# Patient Record
Sex: FEMALE | Race: WHITE | NOT HISPANIC OR LATINO | Employment: FULL TIME | URBAN - METROPOLITAN AREA
[De-identification: names, ages, dates, MRNs, and addresses within clinical notes are randomized per-mention and may not be internally consistent; named-entity substitution may affect disease eponyms.]

---

## 2022-08-06 ENCOUNTER — HOSPITAL ENCOUNTER (EMERGENCY)
Facility: MEDICAL CENTER | Age: 37
End: 2022-08-06
Attending: EMERGENCY MEDICINE
Payer: OTHER MISCELLANEOUS

## 2022-08-06 VITALS
HEART RATE: 72 BPM | OXYGEN SATURATION: 94 % | RESPIRATION RATE: 16 BRPM | BODY MASS INDEX: 41.59 KG/M2 | DIASTOLIC BLOOD PRESSURE: 83 MMHG | HEIGHT: 67 IN | WEIGHT: 265 LBS | TEMPERATURE: 98 F | SYSTOLIC BLOOD PRESSURE: 120 MMHG

## 2022-08-06 DIAGNOSIS — T67.5XXA HEAT EXHAUSTION, INITIAL ENCOUNTER: ICD-10-CM

## 2022-08-06 DIAGNOSIS — N30.00 ACUTE CYSTITIS WITHOUT HEMATURIA: ICD-10-CM

## 2022-08-06 LAB
ANION GAP SERPL CALC-SCNC: 14 MMOL/L (ref 7–16)
APPEARANCE UR: CLEAR
BACTERIA #/AREA URNS HPF: ABNORMAL /HPF
BILIRUB UR QL STRIP.AUTO: NEGATIVE
BUN SERPL-MCNC: 9 MG/DL (ref 8–22)
CALCIUM SERPL-MCNC: 8.9 MG/DL (ref 8.5–10.5)
CHLORIDE SERPL-SCNC: 106 MMOL/L (ref 96–112)
CO2 SERPL-SCNC: 19 MMOL/L (ref 20–33)
COLOR UR: YELLOW
CREAT SERPL-MCNC: 0.99 MG/DL (ref 0.5–1.4)
EKG IMPRESSION: NORMAL
EPI CELLS #/AREA URNS HPF: ABNORMAL /HPF
GFR SERPLBLD CREATININE-BSD FMLA CKD-EPI: 76 ML/MIN/1.73 M 2
GLUCOSE SERPL-MCNC: 103 MG/DL (ref 65–99)
GLUCOSE UR STRIP.AUTO-MCNC: NEGATIVE MG/DL
HCG SERPL QL: NEGATIVE
HYALINE CASTS #/AREA URNS LPF: ABNORMAL /LPF
KETONES UR STRIP.AUTO-MCNC: NEGATIVE MG/DL
LEUKOCYTE ESTERASE UR QL STRIP.AUTO: ABNORMAL
MICRO URNS: ABNORMAL
NITRITE UR QL STRIP.AUTO: NEGATIVE
PH UR STRIP.AUTO: 6 [PH] (ref 5–8)
POTASSIUM SERPL-SCNC: 3.5 MMOL/L (ref 3.6–5.5)
PROT UR QL STRIP: NEGATIVE MG/DL
RBC # URNS HPF: ABNORMAL /HPF
RBC UR QL AUTO: NEGATIVE
SODIUM SERPL-SCNC: 139 MMOL/L (ref 135–145)
SP GR UR STRIP.AUTO: 1.01
UROBILINOGEN UR STRIP.AUTO-MCNC: 0.2 MG/DL
WBC #/AREA URNS HPF: ABNORMAL /HPF

## 2022-08-06 PROCEDURE — 700111 HCHG RX REV CODE 636 W/ 250 OVERRIDE (IP): Performed by: EMERGENCY MEDICINE

## 2022-08-06 PROCEDURE — 93005 ELECTROCARDIOGRAM TRACING: CPT

## 2022-08-06 PROCEDURE — 93005 ELECTROCARDIOGRAM TRACING: CPT | Performed by: EMERGENCY MEDICINE

## 2022-08-06 PROCEDURE — 87077 CULTURE AEROBIC IDENTIFY: CPT

## 2022-08-06 PROCEDURE — 81001 URINALYSIS AUTO W/SCOPE: CPT

## 2022-08-06 PROCEDURE — 96375 TX/PRO/DX INJ NEW DRUG ADDON: CPT

## 2022-08-06 PROCEDURE — 700105 HCHG RX REV CODE 258: Performed by: EMERGENCY MEDICINE

## 2022-08-06 PROCEDURE — 36415 COLL VENOUS BLD VENIPUNCTURE: CPT

## 2022-08-06 PROCEDURE — 87086 URINE CULTURE/COLONY COUNT: CPT

## 2022-08-06 PROCEDURE — 99284 EMERGENCY DEPT VISIT MOD MDM: CPT

## 2022-08-06 PROCEDURE — 84703 CHORIONIC GONADOTROPIN ASSAY: CPT

## 2022-08-06 PROCEDURE — 700102 HCHG RX REV CODE 250 W/ 637 OVERRIDE(OP): Performed by: EMERGENCY MEDICINE

## 2022-08-06 PROCEDURE — A9270 NON-COVERED ITEM OR SERVICE: HCPCS | Performed by: EMERGENCY MEDICINE

## 2022-08-06 PROCEDURE — 80048 BASIC METABOLIC PNL TOTAL CA: CPT

## 2022-08-06 PROCEDURE — 96374 THER/PROPH/DIAG INJ IV PUSH: CPT

## 2022-08-06 RX ORDER — CEFTRIAXONE 2 G/1
2 INJECTION, POWDER, FOR SOLUTION INTRAMUSCULAR; INTRAVENOUS ONCE
Status: COMPLETED | OUTPATIENT
Start: 2022-08-06 | End: 2022-08-06

## 2022-08-06 RX ORDER — SODIUM CHLORIDE, SODIUM LACTATE, POTASSIUM CHLORIDE, CALCIUM CHLORIDE 600; 310; 30; 20 MG/100ML; MG/100ML; MG/100ML; MG/100ML
1000 INJECTION, SOLUTION INTRAVENOUS ONCE
Status: COMPLETED | OUTPATIENT
Start: 2022-08-06 | End: 2022-08-06

## 2022-08-06 RX ORDER — ACETAMINOPHEN 325 MG/1
650 TABLET ORAL ONCE
Status: COMPLETED | OUTPATIENT
Start: 2022-08-06 | End: 2022-08-06

## 2022-08-06 RX ORDER — SULFAMETHOXAZOLE AND TRIMETHOPRIM 800; 160 MG/1; MG/1
1 TABLET ORAL 2 TIMES DAILY
Qty: 14 TABLET | Refills: 0 | Status: SHIPPED | OUTPATIENT
Start: 2022-08-06 | End: 2022-08-10

## 2022-08-06 RX ORDER — KETOROLAC TROMETHAMINE 30 MG/ML
15 INJECTION, SOLUTION INTRAMUSCULAR; INTRAVENOUS ONCE
Status: COMPLETED | OUTPATIENT
Start: 2022-08-06 | End: 2022-08-06

## 2022-08-06 RX ORDER — PROCHLORPERAZINE EDISYLATE 5 MG/ML
10 INJECTION INTRAMUSCULAR; INTRAVENOUS ONCE
Status: COMPLETED | OUTPATIENT
Start: 2022-08-06 | End: 2022-08-06

## 2022-08-06 RX ADMIN — KETOROLAC TROMETHAMINE 15 MG: 30 INJECTION, SOLUTION INTRAMUSCULAR at 18:19

## 2022-08-06 RX ADMIN — CEFTRIAXONE SODIUM 2 G: 2 INJECTION, POWDER, FOR SOLUTION INTRAMUSCULAR; INTRAVENOUS at 19:22

## 2022-08-06 RX ADMIN — PROCHLORPERAZINE EDISYLATE 10 MG: 5 INJECTION INTRAMUSCULAR; INTRAVENOUS at 18:20

## 2022-08-06 RX ADMIN — FENTANYL CITRATE 50 MCG: 50 INJECTION, SOLUTION INTRAMUSCULAR; INTRAVENOUS at 18:20

## 2022-08-06 RX ADMIN — ACETAMINOPHEN 650 MG: 325 TABLET, FILM COATED ORAL at 17:42

## 2022-08-06 RX ADMIN — SODIUM CHLORIDE, POTASSIUM CHLORIDE, SODIUM LACTATE AND CALCIUM CHLORIDE 1000 ML: 600; 310; 30; 20 INJECTION, SOLUTION INTRAVENOUS at 17:42

## 2022-08-06 ASSESSMENT — PAIN DESCRIPTION - PAIN TYPE: TYPE: ACUTE PAIN

## 2022-08-06 ASSESSMENT — LIFESTYLE VARIABLES: DO YOU DRINK ALCOHOL: NO

## 2022-08-06 NOTE — ED TRIAGE NOTES
Patient to ED with complaints of possible heat stroke. She works for the [employer] and was outside in the heat for approximately 2 hrs and developed dizziness and HA. She had a near syncope. EMS reports skin was hot anddiaphoretic on their arrival estimated temp of 102. No s/s of infection. They started cooling efforts. She is now 99.6 oral temp. She is awake and alert. Received 300 ml NS PTA.

## 2022-08-07 NOTE — ED NOTES
Assist: Urine sent to lab. Patient resting on bed talking on phone with side rails up and call light in hand.   Patient on cardiac monitor.     Fluid bolus started.

## 2022-08-07 NOTE — DISCHARGE INSTRUCTIONS
Rest at home and drink lots of fluids and maintain hydration.  You may use Tylenol and Motrin if needed for discomfort.  Return here at once if you feel you are developing new or worsening symptoms.  A work injury form has been filled out for you for the heat exhaustion portion of this visit and you should be okay to return to work on Wednesday, August 10 and this is on the form.

## 2022-08-07 NOTE — ED PROVIDER NOTES
ED Provider Note    CHIEF COMPLAINT  Chief Complaint   Patient presents with   • Heat Exposure   • Near Syncopal       HPI  Steph Roland is a 37 y.o. female who presents to the emergency department after feeling extremely hot and dehydrated and fainted at work.  The patient states that she works as a conductor for [employer] and was working in the yard today she wears a heavy uniform and she was in the sun and heat on a very hot day for about 2 hours and she feels like it was just too much she started to feel dehydrated and tried to drink Gatorade but despite that was extremely sweaty she became very tired she felt faint but did not pass out she developed a headache.  Coworkers noticed that she did not seem to be doing very well and EMS was called.  Apparently prior to arrival the patient had a temperature of 102 but with cooling measures and intravenous fluids her temperature came down to 99.6.  The patient says she is feeling a little bit better now.    REVIEW OF SYSTEMS no chest pain cough or difficulty breathing no hemoptysis no abdominal or back pain.  No pain or discomfort with urination.  All other systems negative    PAST MEDICAL HISTORY  History reviewed. No pertinent past medical history.    FAMILY HISTORY  History reviewed. No pertinent family history.    SOCIAL HISTORY  Social History     Socioeconomic History   • Marital status: Single   Tobacco Use   • Smokeless tobacco: Never Used   Vaping Use   • Vaping Use: Some days   • Substances: Nicotine   Substance and Sexual Activity   • Alcohol use: Yes     Comment: drinks 1-2 times per week   • Drug use: Never       SURGICAL HISTORY  History reviewed. No pertinent surgical history.    CURRENT MEDICATIONS  Home Medications     Reviewed by Elvira Diaz R.N. (Registered Nurse) on 08/06/22 at 1631  Med List Status: Complete   Medication Last Dose Status        Patient Saman Taking any Medications                       ALLERGIES  No Known  "Allergies    PHYSICAL EXAM  VITAL SIGNS: /83   Pulse 72   Temp 36.7 °C (98 °F) (Temporal)   Resp 16   Ht 1.702 m (5' 7\")   Wt 120 kg (265 lb)   LMP 07/23/2022   SpO2 94%   BMI 41.50 kg/m²    Oxygen saturation is interpreted as adequate  Constitutional: Awake and verbal she does not appear toxic or distressed  HENT: No sign of trauma to the head  Eyes: No erythema discharge or jaundice  Neck: Trachea midline no JVD  Cardiovascular: Regular rate and rhythm  Lungs: Clear and equal bilaterally with no apparent difficulty breathing  Abdomen/Back: Obese soft nontender no rebound guarding or peritoneal findings  Skin: Warm and dry  Musculoskeletal: No acute bony deformity  Neurologic: Awake verbal and moving all extremities    Laboratory  Basic metabolic panel shows a slightly low bicarb of 19 urinalysis shows large leukocyte Estrace with  white blood cells and moderate bacteria in the microscopic exam and a pregnancy test is negative.  I have ordered and add on urine culture      MEDICAL DECISION MAKING and DISPOSITION  In the emergency department an IV is established the patient was given intravenous fluids and due to the finding of abnormal urinalysis I ordered and add on culture and give the patient  intravenous ceftriaxone.  The patient is now starting to feel much much better.  I have reviewed all the findings with her.  I think that she suffered from heat exhaustion and that has now improved.  The urinalysis is clearly abnormal suggesting urinary tract infection and I am going to write her a prescription for a 7-day course of Bactrim.  I feel that the heat exhaustion portion of this visit is due to working outside on an extremely hot day and this is a work-related injury and I have filled out the federal work injury report form and advised the patient she will need to follow-up with her employers occupational health clinic during the week for recheck.  I do not think that the urinary tract " infection is work-related but it will require treatment and I have written a prescription for Bactrim as noted above.  The patient is to rest and stay out of the heat and stay well-hydrated she can return to work on Wednesday of next week and she is to follow-up with occupational health as noted.  If it anytime she is developing new or worsening or recurrent symptoms she is to return here for recheck    IMPRESSION  1.  Heat exhaustion, work-related injury  2.  Urinary tract infection      Electronically signed by: Rishi Hammond M.D., 8/6/2022 9:16 PM

## 2022-08-07 NOTE — ED NOTES
Pt given d/c instructions, f/u info and RX x 1 with verbal understanding.  VSS at discharge.  PIV d/c'd with tip intact.  Pt ambulatory from the ED w/ steady gait.  All belongings in possession on discharge.

## 2022-08-08 LAB
BACTERIA UR CULT: ABNORMAL
BACTERIA UR CULT: ABNORMAL
SIGNIFICANT IND 70042: ABNORMAL
SITE SITE: ABNORMAL
SOURCE SOURCE: ABNORMAL

## 2022-08-10 ENCOUNTER — TELEPHONE (OUTPATIENT)
Dept: SCHEDULING | Facility: IMAGING CENTER | Age: 37
End: 2022-08-10

## 2022-08-10 ENCOUNTER — OFFICE VISIT (OUTPATIENT)
Dept: MEDICAL GROUP | Facility: IMAGING CENTER | Age: 37
End: 2022-08-10
Payer: COMMERCIAL

## 2022-08-10 VITALS
DIASTOLIC BLOOD PRESSURE: 80 MMHG | OXYGEN SATURATION: 98 % | TEMPERATURE: 98.4 F | SYSTOLIC BLOOD PRESSURE: 124 MMHG | BODY MASS INDEX: 42.22 KG/M2 | HEIGHT: 67 IN | WEIGHT: 269 LBS | HEART RATE: 70 BPM

## 2022-08-10 DIAGNOSIS — R19.7 DIARRHEA, UNSPECIFIED TYPE: ICD-10-CM

## 2022-08-10 DIAGNOSIS — F33.2 SEVERE EPISODE OF RECURRENT MAJOR DEPRESSIVE DISORDER, WITHOUT PSYCHOTIC FEATURES (HCC): ICD-10-CM

## 2022-08-10 DIAGNOSIS — F41.9 ANXIETY: ICD-10-CM

## 2022-08-10 DIAGNOSIS — Z13.31 DEPRESSION SCREENING: ICD-10-CM

## 2022-08-10 DIAGNOSIS — G43.719 INTRACTABLE CHRONIC MIGRAINE WITHOUT AURA AND WITHOUT STATUS MIGRAINOSUS: ICD-10-CM

## 2022-08-10 DIAGNOSIS — F41.1 GENERALIZED ANXIETY DISORDER: ICD-10-CM

## 2022-08-10 DIAGNOSIS — E66.01 CLASS 3 SEVERE OBESITY DUE TO EXCESS CALORIES WITH SERIOUS COMORBIDITY AND BODY MASS INDEX (BMI) OF 40.0 TO 44.9 IN ADULT (HCC): ICD-10-CM

## 2022-08-10 DIAGNOSIS — Z76.89 ENCOUNTER TO ESTABLISH CARE WITH NEW DOCTOR: ICD-10-CM

## 2022-08-10 DIAGNOSIS — E04.9 THYROID GOITER: ICD-10-CM

## 2022-08-10 PROBLEM — G89.4 CHRONIC PAIN SYNDROME: Status: ACTIVE | Noted: 2021-01-27

## 2022-08-10 PROBLEM — F43.12 CHRONIC POST-TRAUMATIC STRESS DISORDER (PTSD): Status: ACTIVE | Noted: 2021-01-27

## 2022-08-10 PROBLEM — F50.81 BINGE EATING DISORDER: Status: ACTIVE | Noted: 2021-01-27

## 2022-08-10 PROCEDURE — 99204 OFFICE O/P NEW MOD 45 MIN: CPT | Performed by: CLINICAL NURSE SPECIALIST

## 2022-08-10 RX ORDER — SUMATRIPTAN 50 MG/1
50 TABLET, FILM COATED ORAL
Qty: 10 TABLET | Refills: 3 | Status: SHIPPED | OUTPATIENT
Start: 2022-08-10

## 2022-08-10 RX ORDER — SERTRALINE HYDROCHLORIDE 100 MG/1
100 TABLET, FILM COATED ORAL DAILY
COMMUNITY
End: 2022-08-10

## 2022-08-10 RX ORDER — FEXOFENADINE HCL 180 MG/1
180 TABLET ORAL DAILY
COMMUNITY

## 2022-08-10 RX ORDER — LEVONORGESTREL 52 MG/1
1 INTRAUTERINE DEVICE INTRAUTERINE
COMMUNITY
Start: 2020-09-24 | End: 2022-08-24

## 2022-08-10 ASSESSMENT — PATIENT HEALTH QUESTIONNAIRE - PHQ9
5. POOR APPETITE OR OVEREATING: 3 - NEARLY EVERY DAY
CLINICAL INTERPRETATION OF PHQ2 SCORE: 4
SUM OF ALL RESPONSES TO PHQ QUESTIONS 1-9: 17

## 2022-08-10 ASSESSMENT — ANXIETY QUESTIONNAIRES
6. BECOMING EASILY ANNOYED OR IRRITABLE: MORE THAN HALF THE DAYS
2. NOT BEING ABLE TO STOP OR CONTROL WORRYING: NEARLY EVERY DAY
7. FEELING AFRAID AS IF SOMETHING AWFUL MIGHT HAPPEN: NEARLY EVERY DAY
GAD7 TOTAL SCORE: 19
5. BEING SO RESTLESS THAT IT IS HARD TO SIT STILL: MORE THAN HALF THE DAYS
1. FEELING NERVOUS, ANXIOUS, OR ON EDGE: NEARLY EVERY DAY
3. WORRYING TOO MUCH ABOUT DIFFERENT THINGS: NEARLY EVERY DAY
4. TROUBLE RELAXING: NEARLY EVERY DAY

## 2022-08-10 ASSESSMENT — PAIN SCALES - GENERAL: PAINLEVEL: 5=MODERATE PAIN

## 2022-08-10 NOTE — PROGRESS NOTES
Subjective     Steph Roland is a 37 y.o. female who presents with Follow-Up (From 08/06/22) and Diarrhea            HPI  Steph went to ED after she fainted at work.  She feels somewhat better today. Diarrhea clearing up.  Tired with some headache but that is improved.  Yesterday still dizzy.  No current nausea.  Hydrating well.  She had never fainted prior to this.  She denies drug use. Occasional alcohol. None the night before this occurrence.      Severe episode of recurrent major depressive disorder, without psychotic features (HCC)  Work stress high.  Not taking any medication. She was taking sertraline but could not get it consistently and had a difficult withdrawal so she stopped.  Wellbutrin helped but it exacerbated her migraines. She tried Lexapro and cannot remember why she stopped.  Mother had angry outbursts on fluoxetine.  She has had difficulty with pharmacies. No therapist currently.      Generalized anxiety disorder  No panic attacks.       ROS  See HPI     No Known Allergies    Current Outpatient Medications on File Prior to Visit   Medication Sig Dispense Refill    Levonorgestrel (LILETTA, 52 MG,) 20.1 MCG/DAY IUD 1 Each by Intrauterine route.      fexofenadine (ALLEGRA) 180 MG tablet Take 180 mg by mouth every day.       No current facility-administered medications on file prior to visit.     Depression Screening    Little interest or pleasure in doing things?  2 - more than half the days   Feeling down, depressed , or hopeless? 2 - more than half the days   Trouble falling or staying asleep, or sleeping too much?  3 - nearly every day   Feeling tired or having little energy?  2 - more than half the days   Poor appetite or overeating?  3 - nearly every day   Feeling bad about yourself - or that you are a failure or have let yourself or your family down? 1 - several days   Trouble concentrating on things, such as reading the newspaper or watching television? 2 - more than half the days  "  Moving or speaking so slowly that other people could have noticed.  Or the opposite - being so fidgety or restless that you have been moving around a lot more than usual?  2 - more than half the days   Thoughts that you would be better off dead, or of hurting yourself?  0 - not at all   Patient Health Questionnaire Score: 17       If depressive symptoms identified deferred to follow up visit unless specifically addressed in assesment and plan.    Interpretation of PHQ-9 Total Score   Score Severity   1-4 No Depression   5-9 Mild Depression   10-14 Moderate Depression   15-19 Moderately Severe Depression   20-27 Severe Depression    KAELA-7 Questionnaire    Feeling nervous, anxious, or on edge: Nearly every day  Not being able to sop or control worrying: Nearly every day  Worrying too much about different things: Nearly every day  Trouble relaxing: Nearly every day  Being so restless that it's hard to sit still: More than half the days  Becoming easily annoyed or irritable: More than half the days  Feeling afraid as if something awful might happen: Nearly every day  Total: 19    Interpretation of KAELA 7 Total Score   Score Severity :  0-4 No Anxiety   5-9 Mild Anxiety  10-14 Moderate Anxiety  15-21 Severe Anxiety        Objective     /80 (BP Location: Left arm, Patient Position: Sitting, BP Cuff Size: Adult)   Pulse 70   Temp 36.9 °C (98.4 °F) (Temporal)   Ht 1.702 m (5' 7\")   Wt 122 kg (269 lb)   LMP 07/23/2022   SpO2 98%   BMI 42.13 kg/m²      Physical Exam  Constitutional:       General: She is not in acute distress.     Appearance: Normal appearance. She is obese. She is not ill-appearing, toxic-appearing or diaphoretic.   HENT:      Head: Normocephalic and atraumatic.   Eyes:      General: No scleral icterus.     Pupils: Pupils are equal, round, and reactive to light.   Cardiovascular:      Rate and Rhythm: Normal rate.   Pulmonary:      Effort: Pulmonary effort is normal.   Skin:     General: Skin " is warm and dry.   Neurological:      Mental Status: She is alert.      Gait: Gait normal.   Psychiatric:         Mood and Affect: Mood normal. Affect is blunt.         Behavior: Behavior is slowed and withdrawn.         Thought Content: Thought content normal. Thought content does not include suicidal ideation. Thought content does not include suicidal plan.         Judgment: Judgment normal.     US thyroid 1/2021   FINDINGS:     Right Lobe (Long x AP x Trans):  5.4 cm x 1.7 cm x 2.0 cm   Left Lobe (Long x AP x Trans):  5.6 cm x 1.4 cm x 1.9 cm   Isthmus (AP):  3 mm     Parenchyma:  Homogeneous background thyroid parenchyma. Predominantly cystic 1.1   x 0.6 x 0.8 cm nodule in the upper pole the left thyroid lobe (US 0, extremely   low suspicion). Tiny 3 mm colloid cyst in the right isthmus. No suspicious   nodule or calcification.            Assessment & Plan      1. Encounter to establish care with new doctor      2. Depression screening  Positive moderately severe depression and severe anxiety    3. Severe episode of recurrent major depressive disorder, without psychotic features ([facility])  Referral to therapy placed. She declined medication today but we discussed fluoxetine extensively. No personal or family cardiac history.    - Referral to Behavioral Health    4. Anxiety  See #3  - Referral to Behavioral Health    5. Generalized anxiety disorder      6. Class 3 severe obesity due to excess calories with serious comorbidity and body mass index (BMI) of 40.0 to 44.9 in adult ([facility])  Will discuss lifestyle at future visit.    - CBC WITH DIFFERENTIAL; Future  - Comp Metabolic Panel; Future  - HEMOGLOBIN A1C; Future  - Lipid Profile; Future  - VITAMIN D,25 HYDROXY; Future    7. Diarrhea, unspecified type  Improved.    - CBC WITH DIFFERENTIAL; Future  - Comp Metabolic Panel; Future  - MAGNESIUM; Future    8. Thyroid goiter  Historical data. Will check labs.    - TSH; Future  - FREE THYROXINE; Future  - TRIIDOTHYRONINE;  Future  - THYROID PEROX AB TPO (REFLEX); Future  - ANTITHYROGLOBULIN AB; Future  - TSI; Future    9. Intractable chronic migraine without aura and without status migrainosus  Chronic recurrent migraines. She has used Imitrex in the past with success. New rx provided.  Calm magnesium discussed as well.    - SUMAtriptan (IMITREX) 50 MG Tab; Take 1 Tablet by mouth one time as needed for Migraine for up to 1 dose.  Dispense: 10 Tablet; Refill: 3    Return if symptoms worsen or fail to improve, for With test results.

## 2022-08-10 NOTE — ASSESSMENT & PLAN NOTE
Work stress high.  Not taking any medication. She was taking sertraline but could not get it consistently and had a difficult withdrawal so she stopped.  Wellbutrin helped but it exacerbated her migraines. She tried Lexapro and cannot remember why she stopped.  Mother had angry outbursts on fluoxetine.  She has had difficulty with pharmacies. No therapist currently.

## 2022-08-17 ENCOUNTER — HOSPITAL ENCOUNTER (OUTPATIENT)
Dept: LAB | Facility: MEDICAL CENTER | Age: 37
End: 2022-08-17
Attending: CLINICAL NURSE SPECIALIST
Payer: COMMERCIAL

## 2022-08-17 DIAGNOSIS — E04.9 THYROID GOITER: ICD-10-CM

## 2022-08-17 DIAGNOSIS — R19.7 DIARRHEA, UNSPECIFIED TYPE: ICD-10-CM

## 2022-08-17 DIAGNOSIS — E66.01 CLASS 3 SEVERE OBESITY DUE TO EXCESS CALORIES WITH SERIOUS COMORBIDITY AND BODY MASS INDEX (BMI) OF 40.0 TO 44.9 IN ADULT (HCC): ICD-10-CM

## 2022-08-17 LAB
ALBUMIN SERPL BCP-MCNC: 3.9 G/DL (ref 3.2–4.9)
ALBUMIN/GLOB SERPL: 1.4 G/DL
ALP SERPL-CCNC: 51 U/L (ref 30–99)
ALT SERPL-CCNC: 15 U/L (ref 2–50)
ANION GAP SERPL CALC-SCNC: 9 MMOL/L (ref 7–16)
AST SERPL-CCNC: 18 U/L (ref 12–45)
BASOPHILS # BLD AUTO: 0.8 % (ref 0–1.8)
BASOPHILS # BLD: 0.04 K/UL (ref 0–0.12)
BILIRUB SERPL-MCNC: 0.3 MG/DL (ref 0.1–1.5)
BUN SERPL-MCNC: 8 MG/DL (ref 8–22)
CALCIUM SERPL-MCNC: 8.9 MG/DL (ref 8.5–10.5)
CHLORIDE SERPL-SCNC: 108 MMOL/L (ref 96–112)
CHOLEST SERPL-MCNC: 126 MG/DL (ref 100–199)
CO2 SERPL-SCNC: 22 MMOL/L (ref 20–33)
CREAT SERPL-MCNC: 1.03 MG/DL (ref 0.5–1.4)
EOSINOPHIL # BLD AUTO: 0.26 K/UL (ref 0–0.51)
EOSINOPHIL NFR BLD: 4.9 % (ref 0–6.9)
ERYTHROCYTE [DISTWIDTH] IN BLOOD BY AUTOMATED COUNT: 42.5 FL (ref 35.9–50)
EST. AVERAGE GLUCOSE BLD GHB EST-MCNC: 100 MG/DL
FASTING STATUS PATIENT QL REPORTED: NORMAL
GFR SERPLBLD CREATININE-BSD FMLA CKD-EPI: 72 ML/MIN/1.73 M 2
GLOBULIN SER CALC-MCNC: 2.8 G/DL (ref 1.9–3.5)
GLUCOSE SERPL-MCNC: 89 MG/DL (ref 65–99)
HBA1C MFR BLD: 5.1 % (ref 4–5.6)
HCT VFR BLD AUTO: 42.1 % (ref 37–47)
HDLC SERPL-MCNC: 43 MG/DL
HGB BLD-MCNC: 13.8 G/DL (ref 12–16)
IMM GRANULOCYTES # BLD AUTO: 0.02 K/UL (ref 0–0.11)
IMM GRANULOCYTES NFR BLD AUTO: 0.4 % (ref 0–0.9)
LDLC SERPL CALC-MCNC: 69 MG/DL
LYMPHOCYTES # BLD AUTO: 1.64 K/UL (ref 1–4.8)
LYMPHOCYTES NFR BLD: 31.2 % (ref 22–41)
MAGNESIUM SERPL-MCNC: 2.1 MG/DL (ref 1.5–2.5)
MCH RBC QN AUTO: 29.5 PG (ref 27–33)
MCHC RBC AUTO-ENTMCNC: 32.8 G/DL (ref 33.6–35)
MCV RBC AUTO: 90 FL (ref 81.4–97.8)
MONOCYTES # BLD AUTO: 0.3 K/UL (ref 0–0.85)
MONOCYTES NFR BLD AUTO: 5.7 % (ref 0–13.4)
NEUTROPHILS # BLD AUTO: 3 K/UL (ref 2–7.15)
NEUTROPHILS NFR BLD: 57 % (ref 44–72)
NRBC # BLD AUTO: 0 K/UL
NRBC BLD-RTO: 0 /100 WBC
PLATELET # BLD AUTO: 286 K/UL (ref 164–446)
PMV BLD AUTO: 11.7 FL (ref 9–12.9)
POTASSIUM SERPL-SCNC: 4.7 MMOL/L (ref 3.6–5.5)
PROT SERPL-MCNC: 6.7 G/DL (ref 6–8.2)
RBC # BLD AUTO: 4.68 M/UL (ref 4.2–5.4)
SODIUM SERPL-SCNC: 139 MMOL/L (ref 135–145)
TRIGL SERPL-MCNC: 72 MG/DL (ref 0–149)
WBC # BLD AUTO: 5.3 K/UL (ref 4.8–10.8)

## 2022-08-17 PROCEDURE — 80061 LIPID PANEL: CPT

## 2022-08-17 PROCEDURE — 84443 ASSAY THYROID STIM HORMONE: CPT

## 2022-08-17 PROCEDURE — 83036 HEMOGLOBIN GLYCOSYLATED A1C: CPT

## 2022-08-17 PROCEDURE — 83735 ASSAY OF MAGNESIUM: CPT

## 2022-08-17 PROCEDURE — 80053 COMPREHEN METABOLIC PANEL: CPT

## 2022-08-17 PROCEDURE — 84445 ASSAY OF TSI GLOBULIN: CPT

## 2022-08-17 PROCEDURE — 85025 COMPLETE CBC W/AUTO DIFF WBC: CPT

## 2022-08-17 PROCEDURE — 82306 VITAMIN D 25 HYDROXY: CPT

## 2022-08-17 PROCEDURE — 36415 COLL VENOUS BLD VENIPUNCTURE: CPT

## 2022-08-17 PROCEDURE — 86800 THYROGLOBULIN ANTIBODY: CPT

## 2022-08-17 PROCEDURE — 84480 ASSAY TRIIODOTHYRONINE (T3): CPT

## 2022-08-17 PROCEDURE — 86376 MICROSOMAL ANTIBODY EACH: CPT

## 2022-08-17 PROCEDURE — 84439 ASSAY OF FREE THYROXINE: CPT

## 2022-08-18 LAB
25(OH)D3 SERPL-MCNC: 19 NG/ML (ref 30–100)
T3 SERPL-MCNC: 104 NG/DL (ref 60–181)
T4 FREE SERPL-MCNC: 1.1 NG/DL (ref 0.93–1.7)
THYROPEROXIDASE AB SERPL-ACNC: <9 IU/ML (ref 0–9)
TSH SERPL DL<=0.005 MIU/L-ACNC: 1.29 UIU/ML (ref 0.38–5.33)

## 2022-08-19 PROBLEM — E55.9 VITAMIN D DEFICIENCY: Status: ACTIVE | Noted: 2022-08-19

## 2022-08-19 LAB — THYROGLOB AB SERPL-ACNC: <0.9 IU/ML (ref 0–4)

## 2022-08-20 LAB — TSI SER-ACNC: <0.1 IU/L

## 2022-08-24 ENCOUNTER — HOSPITAL ENCOUNTER (OUTPATIENT)
Facility: MEDICAL CENTER | Age: 37
End: 2022-08-24
Attending: CLINICAL NURSE SPECIALIST
Payer: COMMERCIAL

## 2022-08-24 ENCOUNTER — OFFICE VISIT (OUTPATIENT)
Dept: MEDICAL GROUP | Facility: IMAGING CENTER | Age: 37
End: 2022-08-24
Payer: COMMERCIAL

## 2022-08-24 VITALS
OXYGEN SATURATION: 98 % | BODY MASS INDEX: 41.81 KG/M2 | WEIGHT: 266.4 LBS | HEIGHT: 67 IN | DIASTOLIC BLOOD PRESSURE: 72 MMHG | HEART RATE: 78 BPM | TEMPERATURE: 97.9 F | RESPIRATION RATE: 16 BRPM | SYSTOLIC BLOOD PRESSURE: 112 MMHG

## 2022-08-24 DIAGNOSIS — E04.9 THYROID GOITER: ICD-10-CM

## 2022-08-24 DIAGNOSIS — E55.9 VITAMIN D DEFICIENCY: ICD-10-CM

## 2022-08-24 DIAGNOSIS — F41.8 MIXED ANXIETY AND DEPRESSIVE DISORDER: ICD-10-CM

## 2022-08-24 DIAGNOSIS — N30.01 ACUTE CYSTITIS WITH HEMATURIA: ICD-10-CM

## 2022-08-24 DIAGNOSIS — R32 URINARY INCONTINENCE, UNSPECIFIED TYPE: ICD-10-CM

## 2022-08-24 PROBLEM — G47.30 SLEEP APNEA: Status: ACTIVE | Noted: 2022-08-24

## 2022-08-24 LAB
APPEARANCE UR: NORMAL
BILIRUB UR STRIP-MCNC: NEGATIVE MG/DL
COLOR UR AUTO: YELLOW
GLUCOSE UR STRIP.AUTO-MCNC: NEGATIVE MG/DL
KETONES UR STRIP.AUTO-MCNC: NEGATIVE MG/DL
LEUKOCYTE ESTERASE UR QL STRIP.AUTO: NORMAL
NITRITE UR QL STRIP.AUTO: NEGATIVE
PH UR STRIP.AUTO: 6 [PH] (ref 5–8)
PROT UR QL STRIP: NEGATIVE MG/DL
RBC UR QL AUTO: NEGATIVE
SP GR UR STRIP.AUTO: 1.01
UROBILINOGEN UR STRIP-MCNC: 0.2 MG/DL

## 2022-08-24 PROCEDURE — 81002 URINALYSIS NONAUTO W/O SCOPE: CPT | Performed by: CLINICAL NURSE SPECIALIST

## 2022-08-24 PROCEDURE — 87086 URINE CULTURE/COLONY COUNT: CPT

## 2022-08-24 PROCEDURE — 99214 OFFICE O/P EST MOD 30 MIN: CPT | Performed by: CLINICAL NURSE SPECIALIST

## 2022-08-24 RX ORDER — METRONIDAZOLE 500 MG/1
500 TABLET ORAL 2 TIMES DAILY
Qty: 14 TABLET | Refills: 0 | Status: SHIPPED | OUTPATIENT
Start: 2022-08-24

## 2022-08-24 ASSESSMENT — FIBROSIS 4 INDEX: FIB4 SCORE: 0.59

## 2022-08-24 NOTE — ASSESSMENT & PLAN NOTE
Referral for behavioral health noticed today. She has been getting therapy through work.  Not interested in medication management as she is concerned about sexual side effects, visual disturbance. Currently both anxiety and depression but anxiety worse.

## 2022-08-24 NOTE — ASSESSMENT & PLAN NOTE
Recently took antibiotics for a UTI.  She did not feel any symptoms.  She felt one episode of dysuria after sex.  Incontinence improved with antibiotics.

## 2022-08-24 NOTE — PROGRESS NOTES
"Subjective     Steph Roland is a 37 y.o. female who presents with Lab Results (From 8/17/22)            HPI  Urinary incontinence  Recently took antibiotics for a UTI.  She did not feel any symptoms.  She felt one episode of dysuria after sex.  Incontinence improved with antibiotics.     Mixed anxiety and depressive disorder  Referral for behavioral health noticed today. She has been getting therapy through work.  Not interested in medication management as she is concerned about sexual side effects, visual disturbance. Currently both anxiety and depression but anxiety worse.    Vitamin D deficiency  Taking 5000IU started this week.     ROS  See HPI     No Known Allergies    Current Outpatient Medications on File Prior to Visit   Medication Sig Dispense Refill    fexofenadine (ALLEGRA) 180 MG tablet Take 180 mg by mouth every day.      SUMAtriptan (IMITREX) 50 MG Tab Take 1 Tablet by mouth one time as needed for Migraine for up to 1 dose. 10 Tablet 3     No current facility-administered medications on file prior to visit.           Objective     /72 (BP Location: Right arm, Patient Position: Sitting, BP Cuff Size: Large adult)   Pulse 78   Temp 36.6 °C (97.9 °F) (Temporal)   Resp 16   Ht 1.702 m (5' 7\")   Wt 121 kg (266 lb 6.4 oz)   SpO2 98%   BMI 41.72 kg/m²      Physical Exam  Constitutional:       General: She is not in acute distress.     Appearance: Normal appearance. She is not ill-appearing, toxic-appearing or diaphoretic.   HENT:      Head: Normocephalic and atraumatic.   Eyes:      General: No scleral icterus.     Pupils: Pupils are equal, round, and reactive to light.   Cardiovascular:      Rate and Rhythm: Normal rate and regular rhythm.      Heart sounds: Normal heart sounds.   Pulmonary:      Effort: Pulmonary effort is normal.      Breath sounds: Normal breath sounds.   Abdominal:      Palpations: Abdomen is soft.      Tenderness: There is no abdominal tenderness. "   Musculoskeletal:      Cervical back: Neck supple. No tenderness.   Lymphadenopathy:      Cervical: No cervical adenopathy.   Skin:     General: Skin is warm and dry.   Neurological:      Mental Status: She is alert and oriented to person, place, and time.      Gait: Gait normal.   Psychiatric:         Mood and Affect: Mood normal.         Behavior: Behavior normal.         Thought Content: Thought content normal.         Judgment: Judgment normal.             Hospital Outpatient Visit on 08/17/2022   Component Date Value    Thyroid Stimulating Immu* 08/17/2022 <0.10     Anti-Thyroglobulin 08/17/2022 <0.9     T3 08/17/2022 104.0     Free T-4 08/17/2022 1.10     TSH 08/17/2022 1.290     Magnesium 08/17/2022 2.1     25-Hydroxy   Vitamin D 25 08/17/2022 19 (A)    Cholesterol,Tot 08/17/2022 126     Triglycerides 08/17/2022 72     HDL 08/17/2022 43     LDL 08/17/2022 69     Glycohemoglobin 08/17/2022 5.1     Est Avg Glucose 08/17/2022 100     Sodium 08/17/2022 139     Potassium 08/17/2022 4.7     Chloride 08/17/2022 108     Co2 08/17/2022 22     Anion Gap 08/17/2022 9.0     Glucose 08/17/2022 89     Bun 08/17/2022 8     Creatinine 08/17/2022 1.03     Calcium 08/17/2022 8.9     AST(SGOT) 08/17/2022 18     ALT(SGPT) 08/17/2022 15     Alkaline Phosphatase 08/17/2022 51     Total Bilirubin 08/17/2022 0.3     Albumin 08/17/2022 3.9     Total Protein 08/17/2022 6.7     Globulin 08/17/2022 2.8     A-G Ratio 08/17/2022 1.4     WBC 08/17/2022 5.3     RBC 08/17/2022 4.68     Hemoglobin 08/17/2022 13.8     Hematocrit 08/17/2022 42.1     MCV 08/17/2022 90.0     MCH 08/17/2022 29.5     MCHC 08/17/2022 32.8 (A)    RDW 08/17/2022 42.5     Platelet Count 08/17/2022 286     MPV 08/17/2022 11.7     Neutrophils-Polys 08/17/2022 57.00     Lymphocytes 08/17/2022 31.20     Monocytes 08/17/2022 5.70     Eosinophils 08/17/2022 4.90     Basophils 08/17/2022 0.80     Immature Granulocytes 08/17/2022 0.40     Nucleated RBC 08/17/2022 0.00      Neutrophils (Absolute) 2022 3.00     Lymphs (Absolute) 2022 1.64     Monos (Absolute) 2022 0.30     Eos (Absolute) 2022 0.26     Baso (Absolute) 2022 0.04     Immature Granulocytes (a* 2022 0.02     NRBC (Absolute) 2022 0.00     Fasting Status 2022 Fasting     Microsomal -Tpo- Abs 2022 <9.0     GFR (CKD-EPI) 2022 72    Admission on 2022, Discharged on 2022   Component Date Value    Report 2022                      Value:[facility] Emergency Dept.    Test Date:  2022  Pt Name:    MISHA SIMENTAL             Department: ER  MRN:        [id number]                      Room:       [facility]  Gender:     Female                       Technician: [id number]  :        [id number]                   Requested By:ER TRIAGE PROTOCOL  Order #:    [id number]                    Reading MD:    Measurements  Intervals                                Axis  Rate:       106                          P:          53  DC:         137                          QRS:        23  QRSD:       107                          T:          -4  QT:         363  QTc:        483    Interpretive Statements  Sinus tachycardia  Atrial premature complex  Incomplete left bundle branch block  Low voltage, precordial leads  No previous ECG available for comparison      Beta-Hcg Qualitative Ser* 2022 Negative     Sodium 2022 139     Potassium 2022 3.5 (A)    Chloride 2022 106     Co2 2022 19 (A)    Glucose 2022 103 (A)    Bun 2022 9     Creatinine 2022 0.99     Calcium 2022 8.9     Anion Gap 2022 14.0     Color 2022 Yellow     Character 2022 Clear     Specific Gravity 2022 1.006     Ph 2022 6.0     Glucose 2022 Negative     Ketones 2022 Negative     Protein 2022 Negative     Bilirubin 2022 Negative     Urobilinogen, Urine 2022 0.2     Nitrite 2022  Negative     Leukocyte Esterase 08/06/2022 Large (A)    Occult Blood 08/06/2022 Negative     Micro Urine Req 08/06/2022 Microscopic     GFR (CKD-EPI) 08/06/2022 76     WBC 08/06/2022  (A)    RBC 08/06/2022 2-5 (A)    Bacteria 08/06/2022 Moderate (A)    Epithelial Cells 08/06/2022 Few     Hyaline Cast 08/06/2022 0-2     Significant Indicator 08/06/2022 POS (A)    Source 08/06/2022 UR     Site 08/06/2022 -     Culture Result 08/06/2022 Usual urogenital mandy 10-50,000 cfu/mL (A)    Culture Result 08/06/2022  (A)                    Value:Gardnerella vaginalis  >100,000 cfu/mL                         Assessment & Plan      1. Urinary incontinence, unspecified type  Improved with treatment of UTI.  As BV was culture growth recently, will treat with metronidazole BID for one week.  Culture will be sent.  GFR in 70s without other unusual kidney values. Discussed ensuring adequate hydration and lower salt diet.  Recheck in 3-6 months.       - Basic Metabolic Panel; Future  - POCT Urinalysis-POSITIVE    2. Mixed anxiety and depressive disorder  Continue with behavioral health. She will look into buspirone.    3. Vitamin D deficiency  Continue taking vitamin D and repeat lab in 3-6 months.    - VITAMIN D,25 HYDROXY; Future    4. Thyroid goiter  Known thyroid nodule.  Thyroid labs normal. No autoantibodies.    - US-SOFT TISSUES OF HEAD - NECK; Future    5. Acute cystitis with hematuria  See #1    - URINE CULTURE(NEW); Future  - metroNIDAZOLE (FLAGYL) 500 MG Tab; Take 1 Tablet by mouth 2 times a day.  Dispense: 14 Tablet; Refill: 0    Return if symptoms worsen or fail to improve, for With test results.

## 2022-08-26 LAB
BACTERIA UR CULT: NORMAL
SIGNIFICANT IND 70042: NORMAL
SITE SITE: NORMAL
SOURCE SOURCE: NORMAL

## 2022-08-30 ENCOUNTER — HOSPITAL ENCOUNTER (OUTPATIENT)
Dept: RADIOLOGY | Facility: MEDICAL CENTER | Age: 37
End: 2022-08-30
Attending: CLINICAL NURSE SPECIALIST
Payer: COMMERCIAL

## 2022-08-30 DIAGNOSIS — E04.9 THYROID GOITER: ICD-10-CM

## 2022-08-30 PROCEDURE — 76536 US EXAM OF HEAD AND NECK: CPT

## 2022-09-21 ENCOUNTER — HOSPITAL ENCOUNTER (OUTPATIENT)
Facility: MEDICAL CENTER | Age: 37
End: 2022-09-21
Attending: CLINICAL NURSE SPECIALIST
Payer: COMMERCIAL

## 2022-09-21 ENCOUNTER — OFFICE VISIT (OUTPATIENT)
Dept: MEDICAL GROUP | Facility: IMAGING CENTER | Age: 37
End: 2022-09-21
Payer: COMMERCIAL

## 2022-09-21 VITALS
BODY MASS INDEX: 42.91 KG/M2 | SYSTOLIC BLOOD PRESSURE: 114 MMHG | HEART RATE: 78 BPM | WEIGHT: 273.4 LBS | OXYGEN SATURATION: 95 % | RESPIRATION RATE: 16 BRPM | TEMPERATURE: 97.9 F | HEIGHT: 67 IN | DIASTOLIC BLOOD PRESSURE: 68 MMHG

## 2022-09-21 DIAGNOSIS — E04.1 THYROID NODULE: ICD-10-CM

## 2022-09-21 DIAGNOSIS — R82.90 ABNORMAL FINDING ON URINALYSIS: ICD-10-CM

## 2022-09-21 DIAGNOSIS — F33.2 SEVERE EPISODE OF RECURRENT MAJOR DEPRESSIVE DISORDER, WITHOUT PSYCHOTIC FEATURES (HCC): ICD-10-CM

## 2022-09-21 DIAGNOSIS — Z87.440 HISTORY OF RECURRENT UTIS: ICD-10-CM

## 2022-09-21 LAB
APPEARANCE UR: CLEAR
BILIRUB UR STRIP-MCNC: NEGATIVE MG/DL
COLOR UR AUTO: NORMAL
GLUCOSE UR STRIP.AUTO-MCNC: NEGATIVE MG/DL
KETONES UR STRIP.AUTO-MCNC: NEGATIVE MG/DL
LEUKOCYTE ESTERASE UR QL STRIP.AUTO: NORMAL
NITRITE UR QL STRIP.AUTO: NEGATIVE
PH UR STRIP.AUTO: 5.5 [PH] (ref 5–8)
PROT UR QL STRIP: NEGATIVE MG/DL
RBC UR QL AUTO: NEGATIVE
SP GR UR STRIP.AUTO: 1.01
UROBILINOGEN UR STRIP-MCNC: 0.2 MG/DL

## 2022-09-21 PROCEDURE — 99213 OFFICE O/P EST LOW 20 MIN: CPT | Performed by: CLINICAL NURSE SPECIALIST

## 2022-09-21 PROCEDURE — 87086 URINE CULTURE/COLONY COUNT: CPT

## 2022-09-21 PROCEDURE — 81002 URINALYSIS NONAUTO W/O SCOPE: CPT | Performed by: CLINICAL NURSE SPECIALIST

## 2022-09-21 RX ORDER — CHOLECALCIFEROL (VITAMIN D3) 125 MCG
CAPSULE ORAL
COMMUNITY

## 2022-09-21 ASSESSMENT — PAIN SCALES - GENERAL: PAINLEVEL: NO PAIN

## 2022-09-21 ASSESSMENT — FIBROSIS 4 INDEX: FIB4 SCORE: 0.59

## 2022-09-21 NOTE — PROGRESS NOTES
"Subjective     Steph Roland is a 37 y.o. female who presents with Lab Results            HPI  Steph has called and left messages for endocrinology but has not heard back.      History of recurrent UTIs  No cloudiness or discomfort urinating.      Severe episode of recurrent major depressive disorder, without psychotic features (HCC)  Depression active mainly related to work.  No suicidal ideation.     ROS  See HPI     No Known Allergies    Current Outpatient Medications on File Prior to Visit   Medication Sig Dispense Refill    Cholecalciferol (VITAMIN D) 125 MCG (5000 UT) Cap Take  by mouth.      fexofenadine (ALLEGRA) 180 MG tablet Take 180 mg by mouth every day.      SUMAtriptan (IMITREX) 50 MG Tab Take 1 Tablet by mouth one time as needed for Migraine for up to 1 dose. 10 Tablet 3    metroNIDAZOLE (FLAGYL) 500 MG Tab Take 1 Tablet by mouth 2 times a day. (Patient not taking: Reported on 9/21/2022) 14 Tablet 0     No current facility-administered medications on file prior to visit.           Objective     /68   Pulse 78   Temp 36.6 °C (97.9 °F) (Temporal)   Resp 16   Ht 1.702 m (5' 7\")   Wt 124 kg (273 lb 6.4 oz)   LMP 09/10/2022 (Exact Date)   SpO2 95%   BMI 42.82 kg/m²      Physical Exam  Constitutional:       General: She is not in acute distress.     Appearance: Normal appearance. She is not ill-appearing, toxic-appearing or diaphoretic.   HENT:      Head: Normocephalic and atraumatic.   Eyes:      Pupils: Pupils are equal, round, and reactive to light.   Cardiovascular:      Rate and Rhythm: Normal rate.   Pulmonary:      Effort: Pulmonary effort is normal.   Skin:     General: Skin is warm and dry.   Neurological:      Mental Status: She is alert.      Gait: Gait normal.   Psychiatric:         Mood and Affect: Mood normal.         Behavior: Behavior normal.         Thought Content: Thought content normal.         Judgment: Judgment normal.             US thyroid 2022 "   IMPRESSION:     Left thyroid lobe mixed mass is not suspicious     Left cervical lymph node is nearly above normal limits in size. Recommend clinical correlation     ACR TI-RADS Recommendations           Assessment & Plan      1. History of recurrent UTIs  As previous UTI's asymptomatic, will get UA to ensure clearance of most recent infection.    - POCT Urinalysis  -Culture urine    2. Thyroid nodule    - PTH INTACT (PTH ONLY); Future    3. Severe episode of recurrent major depressive disorder, without psychotic features ([facility])  Spoke with referrals who will reroute behavioral health referral as [facility] informed patient they are not taking new patients at this time. She is not interested in starting medication as she is nervous about this intervention. Monitor    Return if symptoms worsen or fail to improve, for With test results.

## 2022-09-22 DIAGNOSIS — R82.90 ABNORMAL FINDING ON URINALYSIS: ICD-10-CM

## 2022-09-25 LAB
BACTERIA UR CULT: NORMAL
SIGNIFICANT IND 70042: NORMAL
SITE SITE: NORMAL
SOURCE SOURCE: NORMAL

## 2022-12-07 ENCOUNTER — TELEMEDICINE (OUTPATIENT)
Dept: MEDICAL GROUP | Facility: IMAGING CENTER | Age: 37
End: 2022-12-07
Payer: COMMERCIAL

## 2022-12-07 VITALS — BODY MASS INDEX: 42.82 KG/M2 | HEIGHT: 67 IN

## 2022-12-07 DIAGNOSIS — G43.719 INTRACTABLE CHRONIC MIGRAINE WITHOUT AURA AND WITHOUT STATUS MIGRAINOSUS: ICD-10-CM

## 2022-12-07 DIAGNOSIS — Z11.59 NEED FOR HEPATITIS C SCREENING TEST: ICD-10-CM

## 2022-12-07 PROCEDURE — 99214 OFFICE O/P EST MOD 30 MIN: CPT | Mod: 95 | Performed by: CLINICAL NURSE SPECIALIST

## 2022-12-07 RX ORDER — TIZANIDINE 4 MG/1
4 TABLET ORAL EVERY 6 HOURS PRN
Qty: 30 TABLET | Refills: 1 | Status: SHIPPED | OUTPATIENT
Start: 2022-12-07

## 2022-12-07 ASSESSMENT — PAIN SCALES - GENERAL: PAINLEVEL: 6=MODERATE PAIN

## 2022-12-07 NOTE — PROGRESS NOTES
Telemedicine: Established Patient   This evaluation was conducted via Zoom using secure and encrypted videoconferencing technology. The patient was in their home in the [facility].    The patient's identity was confirmed and verbal consent was obtained for this virtual visit.    Subjective:   CC:   Chief Complaint   Patient presents with    Migraine     X1week    Letter for School/Work     For work     Photophobia       HPI:  Intractable chronic migraine without aura and without status migrainosus  Migraine for a week.  Photophobia worse with lights at work.  Neck pain and tooth clenching.  Pain now 6/10, worst 9/10.  She has tried Motrin and Imitrex 50mg, taking daily and helps some but returns quickly.        ROS  See HPI    No Known Allergies    Current medicines (including changes today)  Current Outpatient Medications   Medication Sig Dispense Refill    tizanidine (ZANAFLEX) 4 MG Tab Take 1 Tablet by mouth every 6 hours as needed (muscle spasm). 30 Tablet 1    Cholecalciferol (VITAMIN D) 125 MCG (5000 UT) Cap Take  by mouth.      fexofenadine (ALLEGRA) 180 MG tablet Take 180 mg by mouth every day.      SUMAtriptan (IMITREX) 50 MG Tab Take 1 Tablet by mouth one time as needed for Migraine for up to 1 dose. 10 Tablet 3    metroNIDAZOLE (FLAGYL) 500 MG Tab Take 1 Tablet by mouth 2 times a day. (Patient not taking: Reported on 9/21/2022) 14 Tablet 0     No current facility-administered medications for this visit.       Patient Active Problem List    Diagnosis Date Noted    History of recurrent UTIs 09/21/2022    Sleep apnea 08/24/2022    Urinary incontinence 08/24/2022    Vitamin D deficiency 08/19/2022    Chronic post-traumatic stress disorder (PTSD) 01/27/2021    Mixed anxiety and depressive disorder 01/27/2021    Chronic pain syndrome 01/27/2021    Binge eating disorder 01/27/2021    Intractable chronic migraine without aura and without status migrainosus 12/16/2020    Thyroid goiter 04/13/2020    Severe  episode of recurrent major depressive disorder, without psychotic features ([facility]) 2019    Seborrheic dermatitis of scalp 09/10/2012    Dysmenorrhea 09/10/2012    Slurred speech 2012    Morbid obesity ([facility]) 2012         Family History   Problem Relation Age of Onset    Diabetes Father     Diabetes Paternal Grandmother     Diabetes Paternal Grandfather        She  has a past medical history of Migraine.  She  has no past surgical history on file.       Objective:     Physical Exam     Vitals obtained by patient:  There were no vitals filed for this visit.     Constitutional: Alert, no distress, well-groomed.  Skin: No rashes in visible areas.  Eye: Round. Conjunctiva clear, lids normal. No icterus.   ENMT: Lips pink without lesions, good dentition, moist mucous membranes. Phonation normal.  Neck: No masses, no thyromegaly. Moves freely without pain.  CV: Pulse as reported by patient  Respiratory: Unlabored respiratory effort, no cough or audible wheeze  Psych: Alert and oriented x4, normal affect and mood.     Assessment and Plan:   The following treatment plan was discussed:     1. Intractable chronic migraine without aura and without status migrainosus  Hydrate well. Look into migraine glasses.  Will discuss possible work accommodation in future in person.  Ok to continue Imitrex as needed. Referral for acupuncture placed.     CONTINUE Imitrex 50mg as needed. OK to take 100mg dose if 50mg ineffective.  DO NOT EXCEED 200mg in a 24 hour period.  START Calm magnesium daily according to package instructions  START tizanidine 2-3mg as needed up to 3 times a day for muscle spasms    - tizanidine (ZANAFLEX) 4 MG Tab; Take 1 Tablet by mouth every 6 hours as needed (muscle spasm).  Dispense: 30 Tablet; Refill: 1  - Referral for Acupuncture  - MAGNESIUM; Future  - TESTOSTERONE F&T FEMALES/CHILD; Future    2. Need for hepatitis C screening test    - HCV Scrn ( [id number] 1xLife); Future        Follow-up:  Return if symptoms worsen or fail to improve, for With test results.

## 2022-12-07 NOTE — LETTER
December 7, 2022    To Whom It May Concern:         This is confirmation that Steph Roland attended her scheduled appointment with SANA Luo on 12/07/22.  She is unable to work today and tomorrow due to a medical condition.  It is anticipated she will be able to return to work on Friday, 12/9.         If you have any questions please do not hesitate to call me at the phone number listed below.    Sincerely,      CALLI Luo.  [phone]

## 2022-12-07 NOTE — ASSESSMENT & PLAN NOTE
Migraine for a week.  Photophobia worse with lights at work.  Neck pain and tooth clenching.  Pain now 6/10, worst 9/10.  She has tried Motrin and Imitrex 50mg, taking daily and helps some but returns quickly.

## 2022-12-30 ENCOUNTER — HOSPITAL ENCOUNTER (OUTPATIENT)
Dept: LAB | Facility: MEDICAL CENTER | Age: 37
End: 2022-12-30
Attending: CLINICAL NURSE SPECIALIST
Payer: COMMERCIAL

## 2022-12-30 DIAGNOSIS — E55.9 VITAMIN D DEFICIENCY: ICD-10-CM

## 2022-12-30 DIAGNOSIS — G43.719 INTRACTABLE CHRONIC MIGRAINE WITHOUT AURA AND WITHOUT STATUS MIGRAINOSUS: ICD-10-CM

## 2022-12-30 DIAGNOSIS — E04.1 THYROID NODULE: ICD-10-CM

## 2022-12-30 DIAGNOSIS — R32 URINARY INCONTINENCE, UNSPECIFIED TYPE: ICD-10-CM

## 2022-12-30 DIAGNOSIS — Z11.59 NEED FOR HEPATITIS C SCREENING TEST: ICD-10-CM

## 2022-12-30 LAB
25(OH)D3 SERPL-MCNC: 31 NG/ML (ref 30–100)
ANION GAP SERPL CALC-SCNC: 7 MMOL/L (ref 7–16)
BUN SERPL-MCNC: 10 MG/DL (ref 8–22)
CALCIUM SERPL-MCNC: 9.2 MG/DL (ref 8.5–10.5)
CHLORIDE SERPL-SCNC: 106 MMOL/L (ref 96–112)
CO2 SERPL-SCNC: 25 MMOL/L (ref 20–33)
CREAT SERPL-MCNC: 0.8 MG/DL (ref 0.5–1.4)
GFR SERPLBLD CREATININE-BSD FMLA CKD-EPI: 97 ML/MIN/1.73 M 2
GLUCOSE SERPL-MCNC: 84 MG/DL (ref 65–99)
HCV AB SER QL: NORMAL
MAGNESIUM SERPL-MCNC: 2 MG/DL (ref 1.5–2.5)
POTASSIUM SERPL-SCNC: 4.3 MMOL/L (ref 3.6–5.5)
PTH-INTACT SERPL-MCNC: 39.5 PG/ML (ref 14–72)
SODIUM SERPL-SCNC: 138 MMOL/L (ref 135–145)

## 2022-12-30 PROCEDURE — 83735 ASSAY OF MAGNESIUM: CPT

## 2022-12-30 PROCEDURE — G0472 HEP C SCREEN HIGH RISK/OTHER: HCPCS

## 2022-12-30 PROCEDURE — 83970 ASSAY OF PARATHORMONE: CPT

## 2022-12-30 PROCEDURE — 80048 BASIC METABOLIC PNL TOTAL CA: CPT

## 2022-12-30 PROCEDURE — 36415 COLL VENOUS BLD VENIPUNCTURE: CPT

## 2022-12-30 PROCEDURE — 84270 ASSAY OF SEX HORMONE GLOBUL: CPT

## 2022-12-30 PROCEDURE — 84403 ASSAY OF TOTAL TESTOSTERONE: CPT

## 2022-12-30 PROCEDURE — 84402 ASSAY OF FREE TESTOSTERONE: CPT

## 2022-12-30 PROCEDURE — 82306 VITAMIN D 25 HYDROXY: CPT

## 2023-01-07 LAB
SHBG SERPL-SCNC: 41 NMOL/L (ref 25–122)
TESTOST FREE SERPL-MCNC: 3.5 PG/ML (ref 1.3–9.2)
TESTOST SERPL-MCNC: 24 NG/DL (ref 9–55)